# Patient Record
(demographics unavailable — no encounter records)

---

## 2024-10-18 NOTE — ASSESSMENT
[FreeTextEntry1] : 77-year-old male with intermittent myofascial low back pain possible subtle neurogenic claudication

## 2024-10-18 NOTE — HISTORY OF PRESENT ILLNESS
[de-identified] : 77-year-old male presents as new patient for evaluation of low back pain.  This began somewhat acutely while visiting family in [place] the first week of September.  Describes a sharp stabbing station multiple locations throughout the back.  If he walks he tends to feel better.  Sitting or standing in the same position does be aggravating factor.  Heat such as a hot shower tends to help considerably.  Occasionally some tightness and cramping sensation in the posterior thighs and glutes however this is less of a concern.  His recent also had a significant bout of constipation which feels like may have influenced his low back pain

## 2024-10-18 NOTE — DISCUSSION/SUMMARY
[de-identified] : I reviewed the available imaging with the patient as well as my impression of his symptoms.  I do feel this is largely muscular type back pain given its character and location.  We also discussed the potential for compression of the nerves in the low back causing symptoms in the legs.  At this point I recommended a course of physical therapy we may consider an MRI in the future if compatible with his pacemaker should his symptoms fail to improve satisfactorily  I have spent greater than 60 minutes preparing to see the patient, collecting relevant history, performing a thorough history and physical examination, counseling the patient regarding my findings ordering the appropriate therapies and tests, communicating with other relevant healthcare professionals, documenting my encounter and coordinating care.

## 2024-10-18 NOTE — PHYSICAL EXAM
[UE/LE] : Sensory: Intact in bilateral upper & lower extremities [Normal DTR Reflexes] : DTR reflexes normal [Normal Proprioception] : sensation intact for proprioception [Poor Appearance] : well-appearing [Acute Distress] : not in acute distress [Normal] : Oriented to person, place, and time, insight and judgement were intact and the affect was normal [de-identified] : 4 view lumbar  spine PACS 10/18/2024 Overall coronal and sagittal alignment maintained.  Minimal age-appropriate spondylotic changes at multiple levels with no evidence of instability

## 2024-10-22 NOTE — HISTORY OF PRESENT ILLNESS
[FreeTextEntry1] :  annual physical [de-identified] : Pt is a 77 year old male with hyperlipidemia, coronary artery disease s/p PCI/stent (~ 2004), apical HCM and persistent AF s/p DCCV 1/20/15 and PVI with the cryoballoon 1/12/17, DCCV 5/2020 and s/p DCCV 6/29/21. s/p Idual chamber CD 1/2022 (Biotronik), cardioverted om 12/29/2022 and started on Multaq, s/p re-do AFib ablation 3/9/23. who presents to the office today for an annual physical  constipation: - August 2024 started - no black or bloody stool - c-scope > 15 years ago - pt reports milk of magnesia has been helping  HCM - Covid vaccine: will get at pharmacy - PNA vaccine: next year Prevnar 20 - Influenza vaccine: UTD - Shingrix vaccine: UTD - RSV: needs  - Colonoscopy: needs  - Ophthalmology: needs - will see wife's ophthalmologist   - Dentist: UTD  - Diet: cooking mostly veggies with every meal, chicken and fish for protein - Exercise: walks only 3 x a week - 20 min at a time

## 2024-10-22 NOTE — HEALTH RISK ASSESSMENT
[Never] : Never [Patient reported colonoscopy was normal] : Patient reported colonoscopy was normal [] :  [Fully functional (bathing, dressing, toileting, transferring, walking, feeding)] : Fully functional (bathing, dressing, toileting, transferring, walking, feeding) [Fully functional (using the telephone, shopping, preparing meals, housekeeping, doing laundry, using] : Fully functional and needs no help or supervision to perform IADLs (using the telephone, shopping, preparing meals, housekeeping, doing laundry, using transportation, managing medications and managing finances) [PHQ-2 Negative - No further assessment needed] : PHQ-2 Negative - No further assessment needed [de-identified] :  I spent 5 minutes performing a depression screening on this patient.  [[id number]] : 0  [Reports changes in hearing] : Reports no changes in hearing [Reports changes in vision] : Reports no changes in vision [ColonoscopyComments] : overdue

## 2024-10-22 NOTE — END OF VISIT
[FreeTextEntry3] : I, Dr. Sin, personally performed the evaluation and management (E/M) services for this established patient who presents today with (a) new problem(s)/exacerbation of (an) existing condition(s).  That E/M includes conducting the examination, assessing all new/exacerbated conditions, and establishing a new plan of care.  Today, my PA, Judith Bonilla, was here to observe my evaluation and management services for this new problem/exacerbated condition to be followed going forward.

## 2024-10-22 NOTE — PHYSICAL EXAM
[No Acute Distress] : no acute distress [Well-Appearing] : well-appearing [Non Tender] : non-tender [No Masses] : no abdominal mass palpated [No HSM] : no HSM [Normal Bowel Sounds] : normal bowel sounds [Normal] : no rash [de-identified] : cerumen impaction b/l [de-identified] : protuberant abdomen [de-identified] : scalp WNL

## 2024-11-12 NOTE — ASSESSMENT
[FreeTextEntry1] : 1. CRC screening, change in bowel habits - Cardiology clearance - Then plan for colonoscopy at [facility] with Sutab and low fiber diet/Miralax QD x 1 week before colonoscopy - Hold rivaroxaban 3 days before procedure - Risks/benefits and need for adult chaperone day-of procedure discussed  2. Mild anemia Stable and borderline ferritin but does not meet strict criteria for MARTIN, and no UGI sx, can defer EGD.

## 2024-11-12 NOTE — HISTORY OF PRESENT ILLNESS
[FreeTextEntry1] : 77M with CAD (s/p PCI 2004), apical HCM, afib (s/p DCCV/PVI, on rivaroxaban) p/w CRC screening.  Initially seen 3/2024 for anemia and CRC screening, last colo many yrs ago results unknown. Pt pursued Cologuard however w/ sampling error and did not want to repeat.   INTERVAL 11/12/24: - Developed constipation 8/2024, using milk of magnesia 1x week, now going 1-2x/day without straining. Will be out of country in late November and back by Dec ~12th.  Denies rectal bleeding, melena, odynophagia, dysphagia, heart burn, epigastric/ abdominal pain, n/v, unintentional weight loss. Last set of labs with normocytic anemia, ferritin 34, Tsat 34%  AC: Xarelto, Aspirin, atorvastatin, metoprolol, multaq ETOH: infrequent TOB: never FHx: no GI malignancy, mother with ?cervical cancer

## 2024-11-12 NOTE — END OF VISIT
[] : Fellow [FreeTextEntry3] : Pt seen and d/w fellow.  Will plan for a colonoscopy at [facility].

## 2024-11-12 NOTE — ASSESSMENT
[FreeTextEntry1] : 1. CRC screening, change in bowel habits - Cardiology clearance - Then plan for colonoscopy at [facility] with Sutab and low fiber diet/Miralax QD x 1 week before colonoscopy - Hold rivaroxaban 3 days before procedure - Risks/benefits and need for adult chaperone day-of procedure discussed  2. Mild anemia Stable and borderline ferritin but does not meet strict criteria for MARTIN, and no UGI sx, can defer EGD.

## 2024-11-12 NOTE — END OF VISIT
[] : Fellow [FreeTextEntry3] : Pt seen and d/w fellow.  Will plan for a colonoscopy at [facility].

## 2025-04-01 NOTE — REASON FOR VISIT
[Follow-up Device Check] : is here today for a follow-up device check visit for [Symptom and Test Evaluation] : symptom and test evaluation [FreeTextEntry3] : Merrick Mondragon

## 2025-04-01 NOTE — HISTORY OF PRESENT ILLNESS
[FreeTextEntry1] : Mr. Allred is a pleasant 78 year old male with hyperlipidemia, coronary artery disease s/p PCI/stent (~ 2004), apical HCM and persistent AF s/p DCCV 1/20/15 and PVI with the cryoballoon 1/12/17. He had recurrent Atrial fibrillation in May 2020 and underwent DCCV.  He recurred again in June and is s/p DCCV 6/29/21. s/p Idual chamber CD 1/2022 (OctoniusroniScoopshot).  He was  noted to be back in AFib on remote monitoring 12/9, he was cardioverted om 12/29/2022 and started on Multaq, however he reverted back to atrial fibrillation.  Now s/p re-do AFib ablation 3/9/23.  Maintained on Multaq post procedure.  He had recurrent symptomatic AF, now s/p DCCV 3/3/25.  He feels well post cardioversion.  Compliant with anticoagulation and denies any bleeding issues.  He endorses a history of snoring.  SEE PACEART  Transthoracic Echo was done on 7/2020 that showed normal LVEF, apical HCM with apical aneurysm. No significant valvular disease.  Cardiac MRI was done in 1/2015. Mild apical septal hypertrophy, no LVOT obstruction, EF 67%, no gadolinium enhancement.

## 2025-04-01 NOTE — DISCUSSION/SUMMARY
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  1.  AF Recent recurrent AF despite Multaq.  Given this is the first recurrence since his re-do ablation will make no change at this time Continue Multaq for maintenance of SR  2. Stroke Risk  CHADS VASc at least 2  Continue OAC for TE/CVA ppx  3.  HCM s/p ICD placement No VT/VF on interrogation The device is functioning appropriately as programmed and all measured data is WNL.  The patient is enrolled in remote monitoring   4.  Risk modification Referred to [facility] sleep medicine for WENDI evaluation   He was asked to return for follow-up in six months.

## 2025-04-01 NOTE — CARDIOLOGY SUMMARY
[Normal] : normal [___] : [unfilled] [LVEF ___%] : LVEF [unfilled]% [de-identified] : 11/8/21 SB @ 59 bpm  [de-identified] : LTM 4/29/18 - 5/9/18 SR/SB, no AT/AF [de-identified] : 05/24/2021 INTERPRETATION: I. Clinical Information: : 74 old M underwent a cardiac MRI for the evaluation of apical HCOM II. Technique: Comprehensive Cardiac MRI examination was performed on a 1.5 T scanner using standard cardiac coil, cardiac gating, and standard pulse sequences for the assessment of cardiac morphology, function, and viability. In addition, IV gadolinium contrast was given for the evaluation of cardiac viability using standard delayed hyper-enhancement protocol. Additional pulse sequence for flow quant analysis was performed. Ht: 168cm Wt: 78kg BSA: 1.88m2 Contrast: 15 mL of Multihance / Gadavist III. Comparison: No images available for comparison. IV. Findings A. Cardiac morphology: 1. Left ventricle a. Global systolic function: Normal b. Cavity size: Normal c. Wall thickness: HOCM apical variant with aneurysmal apex 2. Right ventricle a. Global systolic function: Normal . b. Cavity size: Normal 3. Aortic valve a. Leaflets: Normal b. Aortic Regurgitation: mild c. Aortic stenosis: Absent 4. Mitral valve a. Leaflets: Normal b. Leaflet mobility: Normal c. Mitral regurgitation: Absent d. Mitral stenosis: Absent 5. Tricuspid valve a. Leaflets: Normal b. Leaflet mobility: Normal c. Tricuspid regurgitation: Absent d. Tricuspid stenosis: Absent 6.Pulmonic valve a. Leaflets: Normal b. PI: Absent c. PS: Absent 7. Left atrium a. Size: Severely dilated b. Left atrial volume: 124 mL c. Left atrial volume index (Biplane method): 65 ml/m2 8. Right atrium a. Size: Normal b. Right atrial area: 18 cm2 9. Pericardium a.Effusion: None 10. Aorta No significant abnormalities in the visualized portions of the thoracic aorta 11. Pulmonary artery No significant abnormalities in the visualized portions of the pulmonary artery B. Ventricular volume measurements LVEF: 75% LVEDV: 146mL LVESV: 37mL SV: 109mL LVEDVi: 78mL/m2 LVESVi: 20mL/m2 LVSVi: 58mL/m2 RVEF: 69% RVEDV: 144mL RVESV: 44mL SV: 100mL RVEDVi: 76mL/m2 RVESVi: 23mL/m2 RVSVi: 53mL/m2 C.Myocardial wall motion by regions (using 17 segment model): 1.Base a. Anterior: Normal b. Anteroseptal: Normal c. Anterolateral: Normal d. Inferior: Normal e. Inferoseptal: Normal f. Inferolateral: Normal 2. Mid a. Anterior: Normal b. Anteroseptal: Normal c. Anterolateral: Normal d. Inferior: Normal e. Inferoseptal: Normal f. Inferolateral: Normal 3.Apical a. Anterior: Normal b. Septal: Normal c. Inferior: Normal d. Lateral: Normal 4. True [place]: Normal C.Delayed Enhancement/Cardiac viability by Regions (using 17 segment model): 1.Base a. Anterior: Normal b. Anteroseptal: Normal c. Anterolateral: Normal d. Inferior: Normal e. Inferoseptal: Normal f. Inferolateral: Normal 2. Mid a. Anterior: Normal b. Anteroseptal: Normal c. Anterolateral: Normal d. Inferior: Normal e. Inferoseptal: Normal f. Inferolateral: Normal 3.Apical a. Anterior: Normal b. Septal: Normal c. Inferior: Normal d. Lateral: Normal 4. True [place]: Normal D.Q-flow analysis: Qp: Stroke volume: 74mL Forward flow: 74mL Backward flow: 0mL Regurgitant fraction: 0% Qs: Stroke volume: 45mL Forward flow: 53mL Backward flow: 8mL Regurgitant fraction: 16% V. Impression: 1. The left ventricle (LV) is normal in size. There is HOCM apical variant with Aneurysmal [place]. Left ventricular global systolic function is normal. The LV ejection fraction is 75 %. 2. evidence of LV The right ventricle (RV) is normal in size. RV global systolic function is normal. The RV ejection fraction is 69 %. 3. No visually significant valvular abnormalities. 4. No significant abnormalities of the visualized portions of the great vessels. 5. On delayed enhancement imaging, there is no evidence of myocardial scarring. The sequences used in this study were designed for imaging cardiac structures and are suboptimal for imaging other structures and organs. Thank you for the opportunity to participate in the care of this patient. SUSHILA RIVERA MD; Attending Cardiologist This document has been electronically signed. May 25 2021 3:28PM

## 2025-04-01 NOTE — HISTORY OF PRESENT ILLNESS
[FreeTextEntry1] : Mr. Allred is a pleasant 78 year old male with hyperlipidemia, coronary artery disease s/p PCI/stent (~ 2004), apical HCM and persistent AF s/p DCCV 1/20/15 and PVI with the cryoballoon 1/12/17. He had recurrent Atrial fibrillation in May 2020 and underwent DCCV.  He recurred again in June and is s/p DCCV 6/29/21. s/p Idual chamber CD 1/2022 ([place]onshootroniTrivie).  He was  noted to be back in AFib on remote monitoring 12/9, he was cardioverted om 12/29/2022 and started on Multaq, however he reverted back to atrial fibrillation.  Now s/p re-do AFib ablation 3/9/23.  Maintained on Multaq post procedure.  He had recurrent symptomatic AF, now s/p DCCV 3/3/25.  He feels well post cardioversion.  Compliant with anticoagulation and denies any bleeding issues.  He endorses a history of snoring.  SEE PACEART  Transthoracic Echo was done on 7/2020 that showed normal LVEF, apical HCM with apical aneurysm. No significant valvular disease.  Cardiac MRI was done in 1/2015. Mild apical septal hypertrophy, no LVOT obstruction, EF 67%, no gadolinium enhancement.

## 2025-04-01 NOTE — CARDIOLOGY SUMMARY
[Normal] : normal [___] : [unfilled] [LVEF ___%] : LVEF [unfilled]% [de-identified] : 11/8/21 SB @ 59 bpm  [de-identified] : LTM 4/29/18 - 5/9/18 SR/SB, no AT/AF [de-identified] : 05/24/2021 INTERPRETATION: I. Clinical Information: : 74 old M underwent a cardiac MRI for the evaluation of apical HCOM II. Technique: Comprehensive Cardiac MRI examination was performed on a 1.5 T scanner using standard cardiac coil, cardiac gating, and standard pulse sequences for the assessment of cardiac morphology, function, and viability. In addition, IV gadolinium contrast was given for the evaluation of cardiac viability using standard delayed hyper-enhancement protocol. Additional pulse sequence for flow quant analysis was performed. Ht: 168cm Wt: 78kg BSA: 1.88m2 Contrast: 15 mL of Multihance / Gadavist III. Comparison: No images available for comparison. IV. Findings A. Cardiac morphology: 1. Left ventricle a. Global systolic function: Normal b. Cavity size: Normal c. Wall thickness: HOCM apical variant with aneurysmal apex 2. Right ventricle a. Global systolic function: Normal . b. Cavity size: Normal 3. Aortic valve a. Leaflets: Normal b. Aortic Regurgitation: mild c. Aortic stenosis: Absent 4. Mitral valve a. Leaflets: Normal b. Leaflet mobility: Normal c. Mitral regurgitation: Absent d. Mitral stenosis: Absent 5. Tricuspid valve a. Leaflets: Normal b. Leaflet mobility: Normal c. Tricuspid regurgitation: Absent d. Tricuspid stenosis: Absent 6.Pulmonic valve a. Leaflets: Normal b. PI: Absent c. PS: Absent 7. Left atrium a. Size: Severely dilated b. Left atrial volume: 124 mL c. Left atrial volume index (Biplane method): 65 ml/m2 8. Right atrium a. Size: Normal b. Right atrial area: 18 cm2 9. Pericardium a.Effusion: None 10. Aorta No significant abnormalities in the visualized portions of the thoracic aorta 11. Pulmonary artery No significant abnormalities in the visualized portions of the pulmonary artery B. Ventricular volume measurements LVEF: 75% LVEDV: 146mL LVESV: 37mL SV: 109mL LVEDVi: 78mL/m2 LVESVi: 20mL/m2 LVSVi: 58mL/m2 RVEF: 69% RVEDV: 144mL RVESV: 44mL SV: 100mL RVEDVi: 76mL/m2 RVESVi: 23mL/m2 RVSVi: 53mL/m2 C.Myocardial wall motion by regions (using 17 segment model): 1.Base a. Anterior: Normal b. Anteroseptal: Normal c. Anterolateral: Normal d. Inferior: Normal e. Inferoseptal: Normal f. Inferolateral: Normal 2. Mid a. Anterior: Normal b. Anteroseptal: Normal c. Anterolateral: Normal d. Inferior: Normal e. Inferoseptal: Normal f. Inferolateral: Normal 3.Apical a. Anterior: Normal b. Septal: Normal c. Inferior: Normal d. Lateral: Normal 4. True [place]: Normal C.Delayed Enhancement/Cardiac viability by Regions (using 17 segment model): 1.Base a. Anterior: Normal b. Anteroseptal: Normal c. Anterolateral: Normal d. Inferior: Normal e. Inferoseptal: Normal f. Inferolateral: Normal 2. Mid a. Anterior: Normal b. Anteroseptal: Normal c. Anterolateral: Normal d. Inferior: Normal e. Inferoseptal: Normal f. Inferolateral: Normal 3.Apical a. Anterior: Normal b. Septal: Normal c. Inferior: Normal d. Lateral: Normal 4. True [place]: Normal D.Q-flow analysis: Qp: Stroke volume: 74mL Forward flow: 74mL Backward flow: 0mL Regurgitant fraction: 0% Qs: Stroke volume: 45mL Forward flow: 53mL Backward flow: 8mL Regurgitant fraction: 16% V. Impression: 1. The left ventricle (LV) is normal in size. There is HOCM apical variant with Aneurysmal [place]. Left ventricular global systolic function is normal. The LV ejection fraction is 75 %. 2. evidence of LV The right ventricle (RV) is normal in size. RV global systolic function is normal. The RV ejection fraction is 69 %. 3. No visually significant valvular abnormalities. 4. No significant abnormalities of the visualized portions of the great vessels. 5. On delayed enhancement imaging, there is no evidence of myocardial scarring. The sequences used in this study were designed for imaging cardiac structures and are suboptimal for imaging other structures and organs. Thank you for the opportunity to participate in the care of this patient. SUSHILA RIVERA MD; Attending Cardiologist This document has been electronically signed. May 25 2021 3:28PM

## 2025-04-01 NOTE — CARDIOLOGY SUMMARY
[Normal] : normal [___] : [unfilled] [LVEF ___%] : LVEF [unfilled]% [de-identified] : 11/8/21 SB @ 59 bpm  [de-identified] : LTM 4/29/18 - 5/9/18 SR/SB, no AT/AF [de-identified] : 05/24/2021 INTERPRETATION: I. Clinical Information: : 74 old M underwent a cardiac MRI for the evaluation of apical HCOM II. Technique: Comprehensive Cardiac MRI examination was performed on a 1.5 T scanner using standard cardiac coil, cardiac gating, and standard pulse sequences for the assessment of cardiac morphology, function, and viability. In addition, IV gadolinium contrast was given for the evaluation of cardiac viability using standard delayed hyper-enhancement protocol. Additional pulse sequence for flow quant analysis was performed. Ht: 168cm Wt: 78kg BSA: 1.88m2 Contrast: 15 mL of Multihance / Gadavist III. Comparison: No images available for comparison. IV. Findings A. Cardiac morphology: 1. Left ventricle a. Global systolic function: Normal b. Cavity size: Normal c. Wall thickness: HOCM apical variant with aneurysmal apex 2. Right ventricle a. Global systolic function: Normal . b. Cavity size: Normal 3. Aortic valve a. Leaflets: Normal b. Aortic Regurgitation: mild c. Aortic stenosis: Absent 4. Mitral valve a. Leaflets: Normal b. Leaflet mobility: Normal c. Mitral regurgitation: Absent d. Mitral stenosis: Absent 5. Tricuspid valve a. Leaflets: Normal b. Leaflet mobility: Normal c. Tricuspid regurgitation: Absent d. Tricuspid stenosis: Absent 6.Pulmonic valve a. Leaflets: Normal b. PI: Absent c. PS: Absent 7. Left atrium a. Size: Severely dilated b. Left atrial volume: 124 mL c. Left atrial volume index (Biplane method): 65 ml/m2 8. Right atrium a. Size: Normal b. Right atrial area: 18 cm2 9. Pericardium a.Effusion: None 10. Aorta No significant abnormalities in the visualized portions of the thoracic aorta 11. Pulmonary artery No significant abnormalities in the visualized portions of the pulmonary artery B. Ventricular volume measurements LVEF: 75% LVEDV: 146mL LVESV: 37mL SV: 109mL LVEDVi: 78mL/m2 LVESVi: 20mL/m2 LVSVi: 58mL/m2 RVEF: 69% RVEDV: 144mL RVESV: 44mL SV: 100mL RVEDVi: 76mL/m2 RVESVi: 23mL/m2 RVSVi: 53mL/m2 C.Myocardial wall motion by regions (using 17 segment model): 1.Base a. Anterior: Normal b. Anteroseptal: Normal c. Anterolateral: Normal d. Inferior: Normal e. Inferoseptal: Normal f. Inferolateral: Normal 2. Mid a. Anterior: Normal b. Anteroseptal: Normal c. Anterolateral: Normal d. Inferior: Normal e. Inferoseptal: Normal f. Inferolateral: Normal 3.Apical a. Anterior: Normal b. Septal: Normal c. Inferior: Normal d. Lateral: Normal 4. True [place]: Normal C.Delayed Enhancement/Cardiac viability by Regions (using 17 segment model): 1.Base a. Anterior: Normal b. Anteroseptal: Normal c. Anterolateral: Normal d. Inferior: Normal e. Inferoseptal: Normal f. Inferolateral: Normal 2. Mid a. Anterior: Normal b. Anteroseptal: Normal c. Anterolateral: Normal d. Inferior: Normal e. Inferoseptal: Normal f. Inferolateral: Normal 3.Apical a. Anterior: Normal b. Septal: Normal c. Inferior: Normal d. Lateral: Normal 4. True [place]: Normal D.Q-flow analysis: Qp: Stroke volume: 74mL Forward flow: 74mL Backward flow: 0mL Regurgitant fraction: 0% Qs: Stroke volume: 45mL Forward flow: 53mL Backward flow: 8mL Regurgitant fraction: 16% V. Impression: 1. The left ventricle (LV) is normal in size. There is HOCM apical variant with Aneurysmal [place]. Left ventricular global systolic function is normal. The LV ejection fraction is 75 %. 2. evidence of LV The right ventricle (RV) is normal in size. RV global systolic function is normal. The RV ejection fraction is 69 %. 3. No visually significant valvular abnormalities. 4. No significant abnormalities of the visualized portions of the great vessels. 5. On delayed enhancement imaging, there is no evidence of myocardial scarring. The sequences used in this study were designed for imaging cardiac structures and are suboptimal for imaging other structures and organs. Thank you for the opportunity to participate in the care of this patient. SUSHILA RIVERA MD; Attending Cardiologist This document has been electronically signed. May 25 2021 3:28PM

## 2025-04-01 NOTE — REVIEW OF SYSTEMS
[Negative] : Heme/Lymph [Fever] : no fever [Chills] : no chills [Feeling Fatigued] : not feeling fatigued

## 2025-04-01 NOTE — CARDIOLOGY SUMMARY
[Normal] : normal [___] : [unfilled] [LVEF ___%] : LVEF [unfilled]% [de-identified] : 11/8/21 SB @ 59 bpm  [de-identified] : LTM 4/29/18 - 5/9/18 SR/SB, no AT/AF [de-identified] : 05/24/2021 INTERPRETATION: I. Clinical Information: : 74 old M underwent a cardiac MRI for the evaluation of apical HCOM II. Technique: Comprehensive Cardiac MRI examination was performed on a 1.5 T scanner using standard cardiac coil, cardiac gating, and standard pulse sequences for the assessment of cardiac morphology, function, and viability. In addition, IV gadolinium contrast was given for the evaluation of cardiac viability using standard delayed hyper-enhancement protocol. Additional pulse sequence for flow quant analysis was performed. Ht: 168cm Wt: 78kg BSA: 1.88m2 Contrast: 15 mL of Multihance / Gadavist III. Comparison: No images available for comparison. IV. Findings A. Cardiac morphology: 1. Left ventricle a. Global systolic function: Normal b. Cavity size: Normal c. Wall thickness: HOCM apical variant with aneurysmal apex 2. Right ventricle a. Global systolic function: Normal . b. Cavity size: Normal 3. Aortic valve a. Leaflets: Normal b. Aortic Regurgitation: mild c. Aortic stenosis: Absent 4. Mitral valve a. Leaflets: Normal b. Leaflet mobility: Normal c. Mitral regurgitation: Absent d. Mitral stenosis: Absent 5. Tricuspid valve a. Leaflets: Normal b. Leaflet mobility: Normal c. Tricuspid regurgitation: Absent d. Tricuspid stenosis: Absent 6.Pulmonic valve a. Leaflets: Normal b. PI: Absent c. PS: Absent 7. Left atrium a. Size: Severely dilated b. Left atrial volume: 124 mL c. Left atrial volume index (Biplane method): 65 ml/m2 8. Right atrium a. Size: Normal b. Right atrial area: 18 cm2 9. Pericardium a.Effusion: None 10. Aorta No significant abnormalities in the visualized portions of the thoracic aorta 11. Pulmonary artery No significant abnormalities in the visualized portions of the pulmonary artery B. Ventricular volume measurements LVEF: 75% LVEDV: 146mL LVESV: 37mL SV: 109mL LVEDVi: 78mL/m2 LVESVi: 20mL/m2 LVSVi: 58mL/m2 RVEF: 69% RVEDV: 144mL RVESV: 44mL SV: 100mL RVEDVi: 76mL/m2 RVESVi: 23mL/m2 RVSVi: 53mL/m2 C.Myocardial wall motion by regions (using 17 segment model): 1.Base a. Anterior: Normal b. Anteroseptal: Normal c. Anterolateral: Normal d. Inferior: Normal e. Inferoseptal: Normal f. Inferolateral: Normal 2. Mid a. Anterior: Normal b. Anteroseptal: Normal c. Anterolateral: Normal d. Inferior: Normal e. Inferoseptal: Normal f. Inferolateral: Normal 3.Apical a. Anterior: Normal b. Septal: Normal c. Inferior: Normal d. Lateral: Normal 4. True [place]: Normal C.Delayed Enhancement/Cardiac viability by Regions (using 17 segment model): 1.Base a. Anterior: Normal b. Anteroseptal: Normal c. Anterolateral: Normal d. Inferior: Normal e. Inferoseptal: Normal f. Inferolateral: Normal 2. Mid a. Anterior: Normal b. Anteroseptal: Normal c. Anterolateral: Normal d. Inferior: Normal e. Inferoseptal: Normal f. Inferolateral: Normal 3.Apical a. Anterior: Normal b. Septal: Normal c. Inferior: Normal d. Lateral: Normal 4. True [place]: Normal D.Q-flow analysis: Qp: Stroke volume: 74mL Forward flow: 74mL Backward flow: 0mL Regurgitant fraction: 0% Qs: Stroke volume: 45mL Forward flow: 53mL Backward flow: 8mL Regurgitant fraction: 16% V. Impression: 1. The left ventricle (LV) is normal in size. There is HOCM apical variant with Aneurysmal [place]. Left ventricular global systolic function is normal. The LV ejection fraction is 75 %. 2. evidence of LV The right ventricle (RV) is normal in size. RV global systolic function is normal. The RV ejection fraction is 69 %. 3. No visually significant valvular abnormalities. 4. No significant abnormalities of the visualized portions of the great vessels. 5. On delayed enhancement imaging, there is no evidence of myocardial scarring. The sequences used in this study were designed for imaging cardiac structures and are suboptimal for imaging other structures and organs. Thank you for the opportunity to participate in the care of this patient. SUSHILA RIVERA MD; Attending Cardiologist This document has been electronically signed. May 25 2021 3:28PM

## 2025-04-01 NOTE — ADDENDUM
[FreeTextEntry1] : I, Keira Domingo, am scribing for and the presence of Dr. Campa the following sections: HPI, PMH,Family/social history, ROS, Physical Exam, Assessment / Plan.   I, Winston Campa, personally performed the services described in the documentation, reviewed the documentation recorded by the scribe in my presence and it accurately and completely records my words and actions.

## 2025-04-01 NOTE — PHYSICAL EXAM
[General Appearance - Well Developed] : well developed [Normal Appearance] : normal appearance [Well Groomed] : well groomed [General Appearance - Well Nourished] : well nourished [No Deformities] : no deformities [General Appearance - In No Acute Distress] : no acute distress [Heart Rate And Rhythm] : heart rate and rhythm were normal [Heart Sounds] : normal S1 and S2 [] : no respiratory distress [Respiration, Rhythm And Depth] : normal respiratory rhythm and effort [Exaggerated Use Of Accessory Muscles For Inspiration] : no accessory muscle use [Auscultation Breath Sounds / Voice Sounds] : lungs were clear to auscultation bilaterally [Clean] : clean [Dry] : dry [Healing Well] : healing well [Well Developed] : well developed [Well Nourished] : well nourished [No Acute Distress] : no acute distress [Normal Venous Pressure] : normal venous pressure [No Carotid Bruit] : no carotid bruit [Normal S1, S2] : normal S1, S2 [No Murmur] : no murmur [No Rub] : no rub [No Gallop] : no gallop [Good Air Entry] : good air entry [Normal Bowel Sounds] : normal bowel sounds [Normal] : no edema, no cyanosis, no clubbing, no varicosities [No Edema] : no edema [No Skin Lesions] : no skin lesions [Palpable Crepitus] : no palpable crepitus [Bleeding] : no active bleeding [Foul Odor] : no foul smell [Purulent Drainage] : no purulent drainage [Serosanguineous Drainage] : no serosanquineous drainage [Serous Drainage] : no serous drainage [Erythema] : not erythematous [Warm] : not warm [Tender] : not tender [Indurated] : not indurated [Fluctuant] : not fluctuant

## 2025-04-01 NOTE — DISCUSSION/SUMMARY
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  1.  AF Recent recurrent AF despite Multaq.  Given this is the first recurrence since his re-do ablation will make no change at this time Continue Multaq for maintenance of SR  2. Stroke Risk  CHADS VASc at least 2  Continue OAC for TE/CVA ppx  3.  HCM s/p ICD placement No VT/VF on interrogation The device is functioning appropriately as programmed and all measured data is WNL.  The patient is enrolled in remote monitoring   4.  Risk modification Referred to [facility] sleep medicine for WENDI evaluation   He was asked to return for follow-up in six months.

## 2025-04-01 NOTE — HISTORY OF PRESENT ILLNESS
[FreeTextEntry1] : Mr. Allred is a pleasant 78 year old male with hyperlipidemia, coronary artery disease s/p PCI/stent (~ 2004), apical HCM and persistent AF s/p DCCV 1/20/15 and PVI with the cryoballoon 1/12/17. He had recurrent Atrial fibrillation in May 2020 and underwent DCCV.  He recurred again in June and is s/p DCCV 6/29/21. s/p Idual chamber CD 1/2022 ([employer]).  He was  noted to be back in AFib on remote monitoring 12/9, he was cardioverted om 12/29/2022 and started on Multaq, however he reverted back to atrial fibrillation.  Now s/p re-do AFib ablation 3/9/23.  Maintained on Multaq post procedure.  He had recurrent symptomatic AF, now s/p DCCV 3/3/25.  He feels well post cardioversion.  Compliant with anticoagulation and denies any bleeding issues.  He endorses a history of snoring.  SEE PACEART  Transthoracic Echo was done on 7/2020 that showed normal LVEF, apical HCM with apical aneurysm. No significant valvular disease.  Cardiac MRI was done in 1/2015. Mild apical septal hypertrophy, no LVOT obstruction, EF 67%, no gadolinium enhancement.

## 2025-04-01 NOTE — DISCUSSION/SUMMARY
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  1.  AF Recent recurrent AF despite Multaq.  Given this is the first recurrence since his re-do ablation will make no change at this time Continue Multaq for maintenance of SR  2. Stroke Risk  CHADS VASc at least 2  Continue OAC for TE/CVA ppx  3.  HCM s/p ICD placement No VT/VF on interrogation The device is functioning appropriately as programmed and all measured data is WNL.  The patient is enrolled in remote monitoring   4.  Risk modification Referred to [facility] sleep medicine for WENDI evaluation   He was asked to return for follow-up in six months.

## 2025-04-01 NOTE — HISTORY OF PRESENT ILLNESS
[FreeTextEntry1] : Mr. Allred is a pleasant 78 year old male with hyperlipidemia, coronary artery disease s/p PCI/stent (~ 2004), apical HCM and persistent AF s/p DCCV 1/20/15 and PVI with the cryoballoon 1/12/17. He had recurrent Atrial fibrillation in May 2020 and underwent DCCV.  He recurred again in June and is s/p DCCV 6/29/21. s/p Idual chamber CD 1/2022 ([employer]).  He was  noted to be back in AFib on remote monitoring 12/9, he was cardioverted om 12/29/2022 and started on Multaq, however he reverted back to atrial fibrillation.  Now s/p re-do AFib ablation 3/9/23.  Maintained on Multaq post procedure.  He had recurrent symptomatic AF, now s/p DCCV 3/3/25.  He feels well post cardioversion.  Compliant with anticoagulation and denies any bleeding issues.  He endorses a history of snoring.  SEE PACEART  Transthoracic Echo was done on 7/2020 that showed normal LVEF, apical HCM with apical aneurysm. No significant valvular disease.  Cardiac MRI was done in 1/2015. Mild apical septal hypertrophy, no LVOT obstruction, EF 67%, no gadolinium enhancement.

## 2025-04-01 NOTE — DISCUSSION/SUMMARY
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  1.  AF Recent recurrent AF despite Multaq.  Given this is the first recurrence since his re-do ablation will make no change at this time Continue Multaq for maintenance of SR  2. Stroke Risk  CHADS VASc at least 2  Continue OAC for TE/CVA ppx  3.  HCM s/p ICD placement No VT/VF on interrogation The device is functioning appropriately as programmed and all measured data is WNL.  The patient is enrolled in remote monitoring   4.  Risk modification Referred to [facility] sleep medicine for WENDI evaluation   He was asked to return for follow-up in six months.

## 2025-07-09 NOTE — ASSESSMENT
[FreeTextEntry1] : #Dermatitis - improving but still with lichenified plaque right ankle - c/w Rx  Lidex oint but drop to once daily   #Pruritus - ILK 2.5mc/cc x 0.4cc injected over itchy area today - WCD - pt tolerated well  RTC 3 weeks

## 2025-07-09 NOTE — HISTORY OF PRESENT ILLNESS
[FreeTextEntry1] : RPA-Rash [de-identified] : Tone Allred 77 y/o M presents for f/u on Rash on b/l feet -Rash is improving and less itchy